# Patient Record
Sex: MALE | Race: WHITE | Employment: FULL TIME | URBAN - NONMETROPOLITAN AREA
[De-identification: names, ages, dates, MRNs, and addresses within clinical notes are randomized per-mention and may not be internally consistent; named-entity substitution may affect disease eponyms.]

---

## 2018-08-22 ENCOUNTER — HOSPITAL ENCOUNTER (EMERGENCY)
Age: 28
Discharge: HOME | End: 2018-08-22
Payer: COMMERCIAL

## 2018-08-22 VITALS
TEMPERATURE: 98.9 F | DIASTOLIC BLOOD PRESSURE: 72 MMHG | BODY MASS INDEX: 21.37 KG/M2 | HEART RATE: 82 BPM | OXYGEN SATURATION: 98 % | WEIGHT: 157.8 LBS | RESPIRATION RATE: 16 BRPM | SYSTOLIC BLOOD PRESSURE: 127 MMHG | HEIGHT: 72 IN

## 2018-08-22 DIAGNOSIS — R30.0 DYSURIA: ICD-10-CM

## 2018-08-22 DIAGNOSIS — Z20.2 EXPOSURE TO GONORRHEA: ICD-10-CM

## 2018-08-22 DIAGNOSIS — Z20.2 EXPOSURE TO CHLAMYDIA: Primary | ICD-10-CM

## 2018-08-22 DIAGNOSIS — Z11.3 SCREENING EXAMINATION FOR STD (SEXUALLY TRANSMITTED DISEASE): ICD-10-CM

## 2018-08-22 DIAGNOSIS — R36.9 DRAINAGE FROM PENIS: ICD-10-CM

## 2018-08-22 LAB
BILIRUBIN URINE: NEGATIVE
BLOOD, URINE: ABNORMAL
CHARACTER, URINE: CLEAR
CHLAMYDIA TRACHOMATIS BY RT-PCR: DETECTED
COLOR: ABNORMAL
CT/NG SOURCE: ABNORMAL
GLUCOSE, URINE: NEGATIVE MG/DL
KETONES, URINE: NEGATIVE
LEUKOCYTES, UA: ABNORMAL
NEISSERIA GONORRHOEAE BY RT-PCR: DETECTED
NITRATE, UA: NEGATIVE
PH UA: 6 (ref 5–9)
PROTEIN UA: NEGATIVE MG/DL
REFLEX TO URINE C & S: ABNORMAL
SPECIFIC GRAVITY UA: <= 1.005 (ref 1–1.03)
UROBILINOGEN, URINE: 0.2 EU/DL (ref 0–1)

## 2018-08-22 PROCEDURE — 99213 OFFICE O/P EST LOW 20 MIN: CPT | Performed by: NURSE PRACTITIONER

## 2018-08-22 PROCEDURE — 6360000002 HC RX W HCPCS: Performed by: NURSE PRACTITIONER

## 2018-08-22 PROCEDURE — 87591 N.GONORRHOEAE DNA AMP PROB: CPT

## 2018-08-22 PROCEDURE — 6370000000 HC RX 637 (ALT 250 FOR IP): Performed by: NURSE PRACTITIONER

## 2018-08-22 PROCEDURE — 2709999900 HC NON-CHARGEABLE SUPPLY

## 2018-08-22 PROCEDURE — 96372 THER/PROPH/DIAG INJ SC/IM: CPT

## 2018-08-22 PROCEDURE — 81003 URINALYSIS AUTO W/O SCOPE: CPT

## 2018-08-22 PROCEDURE — 2500000003 HC RX 250 WO HCPCS: Performed by: NURSE PRACTITIONER

## 2018-08-22 PROCEDURE — 87086 URINE CULTURE/COLONY COUNT: CPT

## 2018-08-22 PROCEDURE — 99213 OFFICE O/P EST LOW 20 MIN: CPT

## 2018-08-22 PROCEDURE — 87491 CHLMYD TRACH DNA AMP PROBE: CPT

## 2018-08-22 RX ORDER — METRONIDAZOLE 500 MG/1
2000 TABLET ORAL ONCE
Status: COMPLETED | OUTPATIENT
Start: 2018-08-22 | End: 2018-08-22

## 2018-08-22 RX ORDER — AZITHROMYCIN 250 MG/1
1000 TABLET, FILM COATED ORAL ONCE
Status: COMPLETED | OUTPATIENT
Start: 2018-08-22 | End: 2018-08-22

## 2018-08-22 RX ADMIN — LIDOCAINE HYDROCHLORIDE 250 MG: 10 INJECTION, SOLUTION EPIDURAL; INFILTRATION; INTRACAUDAL; PERINEURAL at 17:33

## 2018-08-22 RX ADMIN — AZITHROMYCIN 1000 MG: 250 TABLET, FILM COATED ORAL at 17:32

## 2018-08-22 RX ADMIN — METRONIDAZOLE 2000 MG: 500 TABLET, FILM COATED ORAL at 17:33

## 2018-08-22 ASSESSMENT — ENCOUNTER SYMPTOMS
SHORTNESS OF BREATH: 0
NAUSEA: 0
CHEST TIGHTNESS: 0
ABDOMINAL PAIN: 0
VOMITING: 0
DIARRHEA: 0

## 2018-08-24 LAB — URINE CULTURE REFLEX: NORMAL

## 2020-04-10 ENCOUNTER — HOSPITAL ENCOUNTER (EMERGENCY)
Age: 30
Discharge: HOME | End: 2020-04-10

## 2020-04-10 VITALS
BODY MASS INDEX: 21.7 KG/M2 | DIASTOLIC BLOOD PRESSURE: 81 MMHG | HEART RATE: 95 BPM | TEMPERATURE: 97.8 F | WEIGHT: 160 LBS | SYSTOLIC BLOOD PRESSURE: 116 MMHG | OXYGEN SATURATION: 98 % | RESPIRATION RATE: 16 BRPM

## 2020-04-10 LAB
AMPHETAMINE+METHAMPHETAMINE URINE SCREEN: NEGATIVE
BARBITURATE QUANTITATIVE URINE: NEGATIVE
BENZODIAZEPINE QUANTITATIVE URINE: NEGATIVE
CANNABINOID QUANTITATIVE URINE: NEGATIVE
COCAINE METABOLITE QUANTITATIVE URINE: NEGATIVE
OPIATES, URINE: NEGATIVE
OXYCODONE: NEGATIVE
PHENCYCLIDINE QUANTITATIVE URINE: NEGATIVE

## 2020-04-10 PROCEDURE — 80307 DRUG TEST PRSMV CHEM ANLYZR: CPT

## 2020-04-10 PROCEDURE — 6360000002 HC RX W HCPCS: Performed by: PHYSICIAN ASSISTANT

## 2020-04-10 PROCEDURE — 96372 THER/PROPH/DIAG INJ SC/IM: CPT

## 2020-04-10 PROCEDURE — 99284 EMERGENCY DEPT VISIT MOD MDM: CPT

## 2020-04-10 RX ORDER — SODIUM CHLORIDE 9 MG/ML
1000 INJECTION, SOLUTION INTRAVENOUS CONTINUOUS
Status: DISCONTINUED | OUTPATIENT
Start: 2020-04-10 | End: 2020-04-10

## 2020-04-10 RX ORDER — LORAZEPAM 2 MG/ML
1 INJECTION INTRAMUSCULAR ONCE
Status: COMPLETED | OUTPATIENT
Start: 2020-04-10 | End: 2020-04-10

## 2020-04-10 RX ORDER — LORAZEPAM 2 MG/ML
1 INJECTION INTRAMUSCULAR ONCE
Status: DISCONTINUED | OUTPATIENT
Start: 2020-04-10 | End: 2020-04-10

## 2020-04-10 RX ORDER — LORAZEPAM 1 MG/1
1 TABLET ORAL
Qty: 1 TABLET | Refills: 0 | Status: SHIPPED | OUTPATIENT
Start: 2020-04-10 | End: 2020-04-10

## 2020-04-10 RX ADMIN — LORAZEPAM 1 MG: 2 INJECTION INTRAMUSCULAR; INTRAVENOUS at 18:34

## 2020-04-10 ASSESSMENT — ENCOUNTER SYMPTOMS
SHORTNESS OF BREATH: 0
ABDOMINAL PAIN: 0
VOMITING: 0
NAUSEA: 0

## 2020-04-10 NOTE — ED NOTES
Pt reminded to  Stay on cart to relax- pt gets himself worked up while pacing the room.       Sander Talbert RN  04/10/20 [id number]

## 2020-04-10 NOTE — ED NOTES
Pt HR in 120s- went to  to check on pt and he was sitting on the side of the bed using the urinal. HR back down WNL once he was sitting down.  Provider updated     Particangella Acevedo RN  04/10/20 3489

## 2020-06-16 ENCOUNTER — NURSE ONLY (OUTPATIENT)
Dept: LAB | Age: 30
End: 2020-06-16

## 2020-06-16 LAB
ALBUMIN SERPL-MCNC: 5 G/DL (ref 3.5–5.1)
ALP BLD-CCNC: 70 U/L (ref 38–126)
ALT SERPL-CCNC: 16 U/L (ref 11–66)
ANION GAP SERPL CALCULATED.3IONS-SCNC: 10 MEQ/L (ref 8–16)
AST SERPL-CCNC: 16 U/L (ref 5–40)
BASOPHILS # BLD: 0.9 %
BASOPHILS ABSOLUTE: 0 THOU/MM3 (ref 0–0.1)
BILIRUB SERPL-MCNC: 0.4 MG/DL (ref 0.3–1.2)
BUN BLDV-MCNC: 9 MG/DL (ref 7–22)
CALCIUM SERPL-MCNC: 9.4 MG/DL (ref 8.5–10.5)
CHLORIDE BLD-SCNC: 103 MEQ/L (ref 98–111)
CHOLESTEROL, TOTAL: 156 MG/DL (ref 100–199)
CO2: 28 MEQ/L (ref 23–33)
CREAT SERPL-MCNC: 0.8 MG/DL (ref 0.4–1.2)
EOSINOPHIL # BLD: 4.1 %
EOSINOPHILS ABSOLUTE: 0.2 THOU/MM3 (ref 0–0.4)
ERYTHROCYTE [DISTWIDTH] IN BLOOD BY AUTOMATED COUNT: 12.4 % (ref 11.5–14.5)
ERYTHROCYTE [DISTWIDTH] IN BLOOD BY AUTOMATED COUNT: 44.5 FL (ref 35–45)
GFR SERPL CREATININE-BSD FRML MDRD: > 90 ML/MIN/1.73M2
GLUCOSE BLD-MCNC: 100 MG/DL (ref 70–108)
HCT VFR BLD CALC: 47.5 % (ref 42–52)
HDLC SERPL-MCNC: 59 MG/DL
HEMOGLOBIN: 15.4 GM/DL (ref 14–18)
IMMATURE GRANS (ABS): 0.01 THOU/MM3 (ref 0–0.07)
IMMATURE GRANULOCYTES: 0.2 %
LDL CHOLESTEROL CALCULATED: 75 MG/DL
LYMPHOCYTES # BLD: 42 %
LYMPHOCYTES ABSOLUTE: 2 THOU/MM3 (ref 1–4.8)
MCH RBC QN AUTO: 31.8 PG (ref 26–33)
MCHC RBC AUTO-ENTMCNC: 32.4 GM/DL (ref 32.2–35.5)
MCV RBC AUTO: 97.9 FL (ref 80–94)
MONOCYTES # BLD: 9.2 %
MONOCYTES ABSOLUTE: 0.4 THOU/MM3 (ref 0.4–1.3)
NUCLEATED RED BLOOD CELLS: 0 /100 WBC
PLATELET # BLD: 237 THOU/MM3 (ref 130–400)
PMV BLD AUTO: 12 FL (ref 9.4–12.4)
POTASSIUM SERPL-SCNC: 4.3 MEQ/L (ref 3.5–5.2)
RBC # BLD: 4.85 MILL/MM3 (ref 4.7–6.1)
SEG NEUTROPHILS: 43.6 %
SEGMENTED NEUTROPHILS ABSOLUTE COUNT: 2 THOU/MM3 (ref 1.8–7.7)
SODIUM BLD-SCNC: 141 MEQ/L (ref 135–145)
TOTAL PROTEIN: 8 G/DL (ref 6.1–8)
TRIGL SERPL-MCNC: 108 MG/DL (ref 0–199)
TSH SERPL DL<=0.05 MIU/L-ACNC: 3.12 UIU/ML (ref 0.4–4.2)
WBC # BLD: 4.7 THOU/MM3 (ref 4.8–10.8)

## 2020-07-13 ENCOUNTER — APPOINTMENT (OUTPATIENT)
Dept: CT IMAGING | Age: 30
End: 2020-07-13
Payer: COMMERCIAL

## 2020-07-13 ENCOUNTER — HOSPITAL ENCOUNTER (EMERGENCY)
Age: 30
Discharge: HOME | End: 2020-07-13
Attending: EMERGENCY MEDICINE
Payer: COMMERCIAL

## 2020-07-13 VITALS
OXYGEN SATURATION: 100 % | RESPIRATION RATE: 16 BRPM | HEIGHT: 72 IN | SYSTOLIC BLOOD PRESSURE: 116 MMHG | WEIGHT: 150 LBS | TEMPERATURE: 98.3 F | BODY MASS INDEX: 20.32 KG/M2 | DIASTOLIC BLOOD PRESSURE: 75 MMHG | HEART RATE: 84 BPM

## 2020-07-13 LAB
AMPHETAMINE+METHAMPHETAMINE URINE SCREEN: NEGATIVE
ANION GAP SERPL CALCULATED.3IONS-SCNC: 13 MEQ/L (ref 8–16)
BARBITURATE QUANTITATIVE URINE: NEGATIVE
BASOPHILS # BLD: 0.6 %
BASOPHILS ABSOLUTE: 0 THOU/MM3 (ref 0–0.1)
BENZODIAZEPINE QUANTITATIVE URINE: NEGATIVE
BILIRUBIN URINE: NEGATIVE
BLOOD, URINE: NEGATIVE
BUN BLDV-MCNC: 13 MG/DL (ref 7–22)
CALCIUM SERPL-MCNC: 9.8 MG/DL (ref 8.5–10.5)
CANNABINOID QUANTITATIVE URINE: NEGATIVE
CHARACTER, URINE: CLEAR
CHLORIDE BLD-SCNC: 102 MEQ/L (ref 98–111)
CO2: 26 MEQ/L (ref 23–33)
COCAINE METABOLITE QUANTITATIVE URINE: NEGATIVE
COLOR: YELLOW
CREAT SERPL-MCNC: 0.9 MG/DL (ref 0.4–1.2)
EKG ATRIAL RATE: 93 BPM
EKG P AXIS: 79 DEGREES
EKG P-R INTERVAL: 146 MS
EKG Q-T INTERVAL: 338 MS
EKG QRS DURATION: 92 MS
EKG QTC CALCULATION (BAZETT): 420 MS
EKG R AXIS: 83 DEGREES
EKG T AXIS: 73 DEGREES
EKG VENTRICULAR RATE: 93 BPM
EOSINOPHIL # BLD: 1 %
EOSINOPHILS ABSOLUTE: 0.1 THOU/MM3 (ref 0–0.4)
ERYTHROCYTE [DISTWIDTH] IN BLOOD BY AUTOMATED COUNT: 11.9 % (ref 11.5–14.5)
ERYTHROCYTE [DISTWIDTH] IN BLOOD BY AUTOMATED COUNT: 40.8 FL (ref 35–45)
GFR SERPL CREATININE-BSD FRML MDRD: > 90 ML/MIN/1.73M2
GLUCOSE BLD-MCNC: 105 MG/DL (ref 70–108)
GLUCOSE URINE: NEGATIVE MG/DL
HCT VFR BLD CALC: 44.6 % (ref 42–52)
HEMOGLOBIN: 15.2 GM/DL (ref 14–18)
IMMATURE GRANS (ABS): 0.02 THOU/MM3 (ref 0–0.07)
IMMATURE GRANULOCYTES: 0.2 %
KETONES, URINE: NEGATIVE
LEUKOCYTE ESTERASE, URINE: NEGATIVE
LYMPHOCYTES # BLD: 16.2 %
LYMPHOCYTES ABSOLUTE: 1.3 THOU/MM3 (ref 1–4.8)
MAGNESIUM: 2 MG/DL (ref 1.6–2.4)
MCH RBC QN AUTO: 32.4 PG (ref 26–33)
MCHC RBC AUTO-ENTMCNC: 34.1 GM/DL (ref 32.2–35.5)
MCV RBC AUTO: 95.1 FL (ref 80–94)
MONOCYTES # BLD: 7.4 %
MONOCYTES ABSOLUTE: 0.6 THOU/MM3 (ref 0.4–1.3)
NITRITE, URINE: NEGATIVE
NUCLEATED RED BLOOD CELLS: 0 /100 WBC
OPIATES, URINE: NEGATIVE
OSMOLALITY CALCULATION: 281.7 MOSMOL/KG (ref 275–300)
OXYCODONE: NEGATIVE
PH UA: 8.5 (ref 5–9)
PHENCYCLIDINE QUANTITATIVE URINE: NEGATIVE
PLATELET # BLD: 246 THOU/MM3 (ref 130–400)
PMV BLD AUTO: 10.2 FL (ref 9.4–12.4)
POTASSIUM SERPL-SCNC: 4.6 MEQ/L (ref 3.5–5.2)
PROTEIN UA: NEGATIVE
RBC # BLD: 4.69 MILL/MM3 (ref 4.7–6.1)
SEG NEUTROPHILS: 74.6 %
SEGMENTED NEUTROPHILS ABSOLUTE COUNT: 6 THOU/MM3 (ref 1.8–7.7)
SODIUM BLD-SCNC: 141 MEQ/L (ref 135–145)
SPECIFIC GRAVITY, URINE: 1.01 (ref 1–1.03)
T4 FREE: 1.18 NG/DL (ref 0.93–1.76)
TROPONIN T: < 0.01 NG/ML
TSH SERPL DL<=0.05 MIU/L-ACNC: 3.38 UIU/ML (ref 0.4–4.2)
UROBILINOGEN, URINE: 0.2 EU/DL (ref 0–1)
WBC # BLD: 8.1 THOU/MM3 (ref 4.8–10.8)

## 2020-07-13 PROCEDURE — 80307 DRUG TEST PRSMV CHEM ANLYZR: CPT

## 2020-07-13 PROCEDURE — 81003 URINALYSIS AUTO W/O SCOPE: CPT

## 2020-07-13 PROCEDURE — 84439 ASSAY OF FREE THYROXINE: CPT

## 2020-07-13 PROCEDURE — 80048 BASIC METABOLIC PNL TOTAL CA: CPT

## 2020-07-13 PROCEDURE — 99284 EMERGENCY DEPT VISIT MOD MDM: CPT

## 2020-07-13 PROCEDURE — 84443 ASSAY THYROID STIM HORMONE: CPT

## 2020-07-13 PROCEDURE — 93010 ELECTROCARDIOGRAM REPORT: CPT | Performed by: NUCLEAR MEDICINE

## 2020-07-13 PROCEDURE — 83735 ASSAY OF MAGNESIUM: CPT

## 2020-07-13 PROCEDURE — 84484 ASSAY OF TROPONIN QUANT: CPT

## 2020-07-13 PROCEDURE — 85025 COMPLETE CBC W/AUTO DIFF WBC: CPT

## 2020-07-13 PROCEDURE — 36415 COLL VENOUS BLD VENIPUNCTURE: CPT

## 2020-07-13 PROCEDURE — 70450 CT HEAD/BRAIN W/O DYE: CPT

## 2020-07-13 PROCEDURE — 93005 ELECTROCARDIOGRAM TRACING: CPT | Performed by: EMERGENCY MEDICINE

## 2020-07-13 ASSESSMENT — ENCOUNTER SYMPTOMS
NAUSEA: 0
CHEST TIGHTNESS: 0
DIARRHEA: 0
RHINORRHEA: 0
SORE THROAT: 0
SHORTNESS OF BREATH: 0
ABDOMINAL PAIN: 0
CONSTIPATION: 0
COLOR CHANGE: 0

## 2020-07-13 NOTE — ED PROVIDER NOTES
**This is a Medical/ PA/ APRN Student Note and is charted for educational purposes. The non-physician staff attested note is not to be used for billing purposes or to guide patient care. Please see the physician modifications/ attestation for treatment plan/suggestions. This note has been reviewed and feedback has been provided to the student. *                    [address]    Chief Complaint   Patient presents with    Palpitations    Shortness of Breath       Nurses Notes reviewed and I agree except as noted in the HPI. HPI    Jose David Hawley is a 34 y.o. male who presents for evaluation of tachycardia. He presents today following three 1-2 minute runs of palpitations while at work. Nothing has made his symptoms better or worse. In addition, he notes some numbness in his fingers, heat flashes, and diaphoresis. He does note that his mother has a history of pheochromocytoma. REVIEW OF SYSTEMS    Review of Systems   Constitutional: Negative for activity change, appetite change, chills and fever. HENT: Negative for rhinorrhea and sore throat. Respiratory: Negative for chest tightness and shortness of breath. Cardiovascular: Positive for palpitations. Negative for chest pain. Gastrointestinal: Negative for abdominal pain, constipation, diarrhea and nausea. Genitourinary: Negative for difficulty urinating, dysuria and enuresis. Skin: Negative for color change and rash. Neurological: Positive for numbness and headaches. Negative for dizziness. PAST MEDICAL HISTORY     has a past medical history of Anxiety, Depression, Irritable bowel syndrome, and Pneumothorax. SURGICAL HISTORY   has a past surgical history that includes Eye surgery; Appendectomy; eye surgery; and Thoracoscopy (08/03/2012).     CURRENT MEDICATIONS    Discharge Medication List as of 7/13/2020  7:45 PM      CONTINUE these medications which have NOT CHANGED    Details   fluticasone (FLONASE) 50 MCG/ACT nasal spray 1 spray by Nasal route daily, Disp-1 Bottle, R-0Print      ibuprofen (IBU) 600 MG tablet Take 1 tablet by mouth every 6 hours as needed for Pain., Disp-120 tablet, R-0             ALLERGIES    is allergic to morphine. FAMILY HISTORY    He indicated that his mother is alive. He indicated that his father is alive. He indicated that the status of his maternal grandfather is unknown. He indicated that the status of his paternal grandmother is unknown. He indicated that the status of his paternal grandfather is unknown. He indicated that the status of his maternal aunt is unknown.   family history includes Diabetes in his maternal aunt, maternal grandfather, mother, paternal grandfather, and paternal grandmother; High Blood Pressure in his mother; High Cholesterol in his father and mother; Other in his mother. Mother had a pheochromocytoma    SOCIAL HISTORY     reports that he has been smoking cigarettes. He has a 1.50 pack-year smoking history. He has never used smokeless tobacco. He reports current alcohol use of about 2.0 standard drinks of alcohol per week. He reports that he does not use drugs. PHYSICAL EXAM      INITIAL VITALS: /75   Pulse 84   Temp 98.3 °F (36.8 °C) (Oral)   Resp 16   Ht 6' (1.829 m)   Wt 150 lb (68 kg)   SpO2 100%   BMI 20.34 kg/m² Estimated body mass index is 20.34 kg/m² as calculated from the following:    Height as of this encounter: 6' (1.829 m). Weight as of this encounter: 150 lb (68 kg). Physical Exam  Constitutional:       Appearance: He is normal weight. HENT:      Head: Normocephalic and atraumatic. Nose: Nose normal.   Neck:      Musculoskeletal: Normal range of motion. Cardiovascular:      Rate and Rhythm: Normal rate and regular rhythm. Pulses: Normal pulses. Pulmonary:      Effort: Pulmonary effort is normal.      Breath sounds: No wheezing, rhonchi or rales. ANION GAP   GLOMERULAR FILTRATION RATE, ESTIMATED   OSMOLALITY     All other unresulted laboratory test above are normal:    Vitals:    Vitals:    07/13/20 1552 07/13/20 1725 07/13/20 1855 07/13/20 1924   BP: (!) 128/93 121/87 113/80 116/75   Pulse: 88 80 79 84   Resp: 18 11 16 16   Temp: 98.3 °F (36.8 °C)      TempSrc: Oral      SpO2: 100% 100% 99% 100%   Weight: 150 lb (68 kg)      Height: 6' (1.829 m)          EMERGENCY DEPARTMENT COURSE:    Medications - No data to display    The pt was seen and evaluated by me. Within the department, I observed the pt's vitalsigns to be within acceptable range. Laboratory and Radiological studies were performed, results were reviewed with the patient. Within the department, the pt was worked up but did not require any treatment. I observed the pt's condition to improved during the duration of their stay. I explained my proposed course of treatment to the pt, and they were amenable to my decision. They were discharged home, and they will return to the ED if their symptoms become more severein nature, or otherwise change. Controlled Substances Monitoring:        CRITICAL CARE:   None. CONSULTS:  None    PROCEDURES:  None. FINAL IMPRESSION       1. Palpitations          DISPOSITION/PLAN  PATIENT REFERRED TO:  DO Marta Ríos [address]  [place]    Schedule an appointment as soon as possible for a visit in 4 days      DISCHARGE MEDICATIONS:  Discharge Medication List as of 7/13/2020  7:45 PM            (Please note that portions of this note were completed with a voice recognition program and electronically transcribed. Efforts were [facility] edit the dictations but occasionally words are mis-transcribed . The transcription may contain errors not detected in proofreading.   This transcription was electronically signed.)     07/31/20 12:12 PM      No att. providers found      Emergency room physician       **This is a Medical/ PA/ APRN Student Note and is charted for educational purposes. The non-physician staff attested note is not to be used for billing purposes or to guide patient care. Please see the physician modifications/ attestation for treatment plan/suggestions. This note has been reviewed and feedback has been provided to the student.  Lacey Mendoza  07/31/20 5664

## 2020-07-13 NOTE — ED NOTES
Upon first contact with patient this RN [facility] [address] Pt resting in bed. No distress noted. Family at bedside. Will continue to monitor.       Sudhakar Holman RN  07/13/20 9830

## 2020-07-13 NOTE — ED NOTES
Bed: 012A  Expected date: 7/13/20  Expected time:   Means of arrival:   Comments:      Gloria Ramirez RN  07/13/20 5081

## 2020-07-13 NOTE — ED PROVIDER NOTES
Attending Physician Note:    Patient was seen by medical student Bea Castaneda and I co-managed the case    I have personally performed and participated in the history, exam and medical decision making and agree with all pertinent clinical information. I have also reviewed and agree with the past medical, family and social history unless otherwise noted. Patient presented to the emergency room due to palpitation 2 times while at work this afternoon. The patient stated he was working, working in [employer] and had palpitation for 2 minutes for 2 times and gotten better when the EMS was called in and brought the patient here. The patient admitted had the same problem in the past, he is being worked out for pheochromocytoma as the mother had pheochromocytoma. The patient admits that he had some lightheaded, dizziness with head movement with tingliness in the fingers at times. 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 Denies any slurring of speech denies any headache no blurred vision no weakness or numbness. Physical examination showed head normocephalic atraumatic oral mucosa is moist neck is supple lungs clear to auscultation, heart regular rate and rhythm, abdomen is flat soft depressible nontender no hepatosplenomegaly normoactive bowel sound, neurological examination is within normal limits. Evaluation: Agree above , seen the patient and discussed the diagnosis and treatment plans     Patient was sent home with a prescription for Holter monitor. Patient was taken off for 4 days from work    FINAL IMPRESSION       1.  Palpitations            DISPOSITION/PLAN  PATIENT REFERRED TO:  Chantale Queen DO  [address]    Schedule an appointment as soon as possible for a visit in 4 days      DISCHARGE MEDICATIONS:  New Prescriptions    No medications on file       (Please note that portions of this note were completed with a voice recognition program and electronically transcribed. Efforts were [facility] edit the dictations but occasionally words are mis-transcribed . The transcription may contain errors not detected in proofreading.   This transcription was electronically signed.)      Ubaldo Hernandez MD      Emergency room physician       Ubaldo Hernandez MD  07/13/20 [address], [place]  07/16/20 8017

## 2020-07-13 NOTE — LETTER
[facility] [address] [address] EMERGENCY DEPT  [address] [place] [id number]  Phone: [phone]               July 13, 2020    Patient: Elizabeth Bell   YOB: 1990   Date of Visit: 7/13/2020       To Whom It May Concern:    Kellie Proctor was seen and treated in our emergency department on 7/13/2020. He may return to work on 7/17/20.       Sincerely,       Moon Ellison MD         Signature:__________________________________

## 2020-07-13 NOTE — ED NOTES
Patient transported to Radiology department via [employer] tech in stable condition.        Dixie Champagne RN  07/13/20 6298

## 2020-07-13 NOTE — ED NOTES
EKG/ holter monitor called for monitor. Will call back with update.       Sudhakar Holman RN  07/13/20 0061

## 2020-07-13 NOTE — ED TRIAGE NOTES
Pt presents to ER via squad with shortness of breath and heart palpitations that started about 45 minutes ago. Pt states he is feeling better at this time, HR currently 88. Squad reports initial HR to be 125. EKG completed. Denies pain.

## 2020-07-14 ENCOUNTER — HOSPITAL ENCOUNTER (OUTPATIENT)
Dept: NON INVASIVE DIAGNOSTICS | Age: 30
Discharge: HOME | End: 2020-07-14
Payer: COMMERCIAL

## 2020-07-14 ENCOUNTER — CARE COORDINATION (OUTPATIENT)
Dept: CARE COORDINATION | Age: 30
End: 2020-07-14

## 2020-07-14 PROCEDURE — 93226 XTRNL ECG REC<48 HR SCAN A/R: CPT

## 2020-07-14 PROCEDURE — 93225 XTRNL ECG REC<48 HRS REC: CPT

## 2020-07-15 ENCOUNTER — CARE COORDINATION (OUTPATIENT)
Dept: CARE COORDINATION | Age: 30
End: 2020-07-15

## 2020-07-15 NOTE — CARE COORDINATION
Attempted to reach [facility] today for ED f/u [facility] outreach. No answer. Message left to return call.

## 2020-07-15 NOTE — CARE COORDINATION
Patient contacted regarding recent discharge and COVID-19 risk. Discussed COVID-19 related testing which was not done at this time. Test results were not done. Patient informed of results, if available? n/a     Care Transition Nurse/ Ambulatory Care Manager contacted the patient by telephone to perform post discharge assessment. Verified name and  with patient as identifiers. Patient has following risk factors of: no known risk factors. CTN/ACM reviewed discharge instructions, medical action plan and red flags related to discharge diagnosis. Reviewed and educated them on any new and changed medications related to discharge diagnosis. Advised obtaining a 90-day supply of all daily and as-needed medications. Education provided regarding infection prevention, and signs and symptoms of COVID-19 and when to seek medical attention with patient who verbalized understanding. Discussed exposure protocols and quarantine from [address] you at higher risk for severe illness  and given an opportunity for questions and concerns. The patient agrees to contact the COVID-19 hotline [phone] or PCP office for questions related to their healthcare. CTN/ACM provided contact information for future reference. From CDC: Are you at higher risk for severe illness?  Wash your hands often.  Avoid close contact (6 feet, which is about two arm lengths) with people who are sick.  Put distance between yourself and other people if COVID-19 is spreading in your community.  Clean and disinfect frequently touched surfaces.  Avoid all cruise travel and non-essential air travel.  Call your healthcare professional if you have concerns about COVID-19 and your underlying condition or if you are sick. For more information on steps you can take to protect yourself, see CDC's How to [address] [place] [address] for follow-up call in 7-14 days based on severity of symptoms and risk factors. Spoke with pt.   Pt currently has holter monitor in place. Reports having 1 episode since d/c. Has f/u arranged with his PCP in upcoming wk. Declines Loop. Reviewed ACP.

## 2020-07-21 LAB
ACQUISITION DURATION: NORMAL S
AVERAGE HEART RATE: 70 BPM
HOOKUP DATE: NORMAL
HOOKUP TIME: NORMAL
MAX HEART RATE TIME/DATE: NORMAL
MAX HEART RATE: 163 BPM
MIN HEART RATE TIME/DATE: NORMAL
MIN HEART RATE: 42 BPM
NUMBER OF QRS COMPLEXES: NORMAL
NUMBER OF SUPRAVENTRICULAR BEATS IN RUNS: 0
NUMBER OF SUPRAVENTRICULAR COUPLETS: 0
NUMBER OF SUPRAVENTRICULAR ECTOPICS: 2
NUMBER OF SUPRAVENTRICULAR ISOLATED BEATS: 2
NUMBER OF SUPRAVENTRICULAR RUNS: 0
NUMBER OF VENTRICULAR BEATS IN RUNS: 0
NUMBER OF VENTRICULAR BIGEMINAL CYCLES: 0
NUMBER OF VENTRICULAR COUPLETS: 1
NUMBER OF VENTRICULAR ECTOPICS: 3
NUMBER OF VENTRICULAR ISOLATED BEATS: 1
NUMBER OF VENTRICULAR RUNS: 0

## 2020-07-29 ENCOUNTER — CARE COORDINATION (OUTPATIENT)
Dept: CARE COORDINATION | Age: 30
End: 2020-07-29

## 2020-07-29 NOTE — CARE COORDINATION
Attempted to reach [facility] today for ED f/u covid resolve. No answer. Message left to return call.

## 2021-09-29 ENCOUNTER — TELEPHONE (OUTPATIENT)
Dept: CARDIOLOGY CLINIC | Age: 31
End: 2021-09-29

## 2021-10-05 NOTE — TELEPHONE ENCOUNTER
LM for pt to return call. Closing encounter. Will send encounter to Dr Sravanthi Stevens office to let them know pt has not responded.

## 2021-10-26 ENCOUNTER — HOSPITAL ENCOUNTER (EMERGENCY)
Age: 31
Discharge: HOME | End: 2021-10-26
Payer: COMMERCIAL

## 2021-10-26 ENCOUNTER — APPOINTMENT (OUTPATIENT)
Dept: GENERAL RADIOLOGY | Age: 31
End: 2021-10-26
Payer: COMMERCIAL

## 2021-10-26 VITALS
HEIGHT: 70 IN | DIASTOLIC BLOOD PRESSURE: 84 MMHG | TEMPERATURE: 98.7 F | SYSTOLIC BLOOD PRESSURE: 120 MMHG | RESPIRATION RATE: 11 BRPM | WEIGHT: 150 LBS | BODY MASS INDEX: 21.47 KG/M2 | OXYGEN SATURATION: 100 % | HEART RATE: 70 BPM

## 2021-10-26 DIAGNOSIS — F41.9 ANXIETY: ICD-10-CM

## 2021-10-26 DIAGNOSIS — R00.2 PALPITATIONS: Primary | ICD-10-CM

## 2021-10-26 LAB
ALBUMIN SERPL-MCNC: 4.9 G/DL (ref 3.5–5.1)
ALP BLD-CCNC: 78 U/L (ref 38–126)
ALT SERPL-CCNC: 20 U/L (ref 11–66)
AMPHETAMINE+METHAMPHETAMINE URINE SCREEN: NEGATIVE
ANION GAP SERPL CALCULATED.3IONS-SCNC: 10 MEQ/L (ref 8–16)
AST SERPL-CCNC: 20 U/L (ref 5–40)
BARBITURATE QUANTITATIVE URINE: NEGATIVE
BASOPHILS # BLD: 0.9 %
BASOPHILS ABSOLUTE: 0 THOU/MM3 (ref 0–0.1)
BENZODIAZEPINE QUANTITATIVE URINE: NEGATIVE
BILIRUB SERPL-MCNC: 0.3 MG/DL (ref 0.3–1.2)
BILIRUBIN DIRECT: < 0.2 MG/DL (ref 0–0.3)
BILIRUBIN URINE: NEGATIVE
BLOOD, URINE: NEGATIVE
BUN BLDV-MCNC: 16 MG/DL (ref 7–22)
CALCIUM SERPL-MCNC: 9.9 MG/DL (ref 8.5–10.5)
CANNABINOID QUANTITATIVE URINE: NEGATIVE
CHARACTER, URINE: CLEAR
CHLORIDE BLD-SCNC: 98 MEQ/L (ref 98–111)
CO2: 28 MEQ/L (ref 23–33)
COCAINE METABOLITE QUANTITATIVE URINE: NEGATIVE
COLOR: YELLOW
CREAT SERPL-MCNC: 1 MG/DL (ref 0.4–1.2)
D-DIMER QUANTITATIVE: 293 NG/ML FEU (ref 0–500)
EKG ATRIAL RATE: 69 BPM
EKG ATRIAL RATE: 84 BPM
EKG P AXIS: 40 DEGREES
EKG P AXIS: 73 DEGREES
EKG P-R INTERVAL: 116 MS
EKG P-R INTERVAL: 122 MS
EKG Q-T INTERVAL: 348 MS
EKG Q-T INTERVAL: 378 MS
EKG QRS DURATION: 86 MS
EKG QRS DURATION: 90 MS
EKG QTC CALCULATION (BAZETT): 405 MS
EKG QTC CALCULATION (BAZETT): 411 MS
EKG R AXIS: 61 DEGREES
EKG R AXIS: 85 DEGREES
EKG T AXIS: -4 DEGREES
EKG T AXIS: 4 DEGREES
EKG VENTRICULAR RATE: 69 BPM
EKG VENTRICULAR RATE: 84 BPM
EOSINOPHIL # BLD: 3 %
EOSINOPHILS ABSOLUTE: 0.1 THOU/MM3 (ref 0–0.4)
ERYTHROCYTE [DISTWIDTH] IN BLOOD BY AUTOMATED COUNT: 12.3 % (ref 11.5–14.5)
ERYTHROCYTE [DISTWIDTH] IN BLOOD BY AUTOMATED COUNT: 41.9 FL (ref 35–45)
GFR SERPL CREATININE-BSD FRML MDRD: 87 ML/MIN/1.73M2
GLUCOSE BLD-MCNC: 83 MG/DL (ref 70–108)
GLUCOSE URINE: NEGATIVE MG/DL
HCT VFR BLD CALC: 44.7 % (ref 42–52)
HEMOGLOBIN: 15 GM/DL (ref 14–18)
IMMATURE GRANS (ABS): 0.01 THOU/MM3 (ref 0–0.07)
IMMATURE GRANULOCYTES: 0.2 %
KETONES, URINE: NEGATIVE
LEUKOCYTE ESTERASE, URINE: NEGATIVE
LIPASE: 25.6 U/L (ref 5.6–51.3)
LYMPHOCYTES # BLD: 26.7 %
LYMPHOCYTES ABSOLUTE: 1.3 THOU/MM3 (ref 1–4.8)
MAGNESIUM: 2 MG/DL (ref 1.6–2.4)
MCH RBC QN AUTO: 31.1 PG (ref 26–33)
MCHC RBC AUTO-ENTMCNC: 33.6 GM/DL (ref 32.2–35.5)
MCV RBC AUTO: 92.7 FL (ref 80–94)
MONOCYTES # BLD: 9.9 %
MONOCYTES ABSOLUTE: 0.5 THOU/MM3 (ref 0.4–1.3)
NITRITE, URINE: NEGATIVE
NUCLEATED RED BLOOD CELLS: 0 /100 WBC
OPIATES, URINE: NEGATIVE
OSMOLALITY CALCULATION: 272.3 MOSMOL/KG (ref 275–300)
OXYCODONE: NEGATIVE
PH UA: 8 (ref 5–9)
PHENCYCLIDINE QUANTITATIVE URINE: NEGATIVE
PLATELET # BLD: 244 THOU/MM3 (ref 130–400)
PMV BLD AUTO: 10.3 FL (ref 9.4–12.4)
POTASSIUM SERPL-SCNC: 4.1 MEQ/L (ref 3.5–5.2)
PROTEIN UA: NEGATIVE
RBC # BLD: 4.82 MILL/MM3 (ref 4.7–6.1)
SEG NEUTROPHILS: 59.3 %
SEGMENTED NEUTROPHILS ABSOLUTE COUNT: 2.8 THOU/MM3 (ref 1.8–7.7)
SODIUM BLD-SCNC: 136 MEQ/L (ref 135–145)
SPECIFIC GRAVITY, URINE: 1.01 (ref 1–1.03)
T4 FREE: 1.3 NG/DL (ref 0.93–1.76)
TOTAL PROTEIN: 7.9 G/DL (ref 6.1–8)
TROPONIN T: < 0.01 NG/ML
TROPONIN T: < 0.01 NG/ML
TSH SERPL DL<=0.05 MIU/L-ACNC: 5.68 UIU/ML (ref 0.4–4.2)
UROBILINOGEN, URINE: 0.2 EU/DL (ref 0–1)
WBC # BLD: 4.7 THOU/MM3 (ref 4.8–10.8)

## 2021-10-26 PROCEDURE — 36415 COLL VENOUS BLD VENIPUNCTURE: CPT

## 2021-10-26 PROCEDURE — 80307 DRUG TEST PRSMV CHEM ANLYZR: CPT

## 2021-10-26 PROCEDURE — 84443 ASSAY THYROID STIM HORMONE: CPT

## 2021-10-26 PROCEDURE — 83690 ASSAY OF LIPASE: CPT

## 2021-10-26 PROCEDURE — 84439 ASSAY OF FREE THYROXINE: CPT

## 2021-10-26 PROCEDURE — 81003 URINALYSIS AUTO W/O SCOPE: CPT

## 2021-10-26 PROCEDURE — 85025 COMPLETE CBC W/AUTO DIFF WBC: CPT

## 2021-10-26 PROCEDURE — 93005 ELECTROCARDIOGRAM TRACING: CPT | Performed by: NURSE PRACTITIONER

## 2021-10-26 PROCEDURE — 71045 X-RAY EXAM CHEST 1 VIEW: CPT

## 2021-10-26 PROCEDURE — 83735 ASSAY OF MAGNESIUM: CPT

## 2021-10-26 PROCEDURE — 85379 FIBRIN DEGRADATION QUANT: CPT

## 2021-10-26 PROCEDURE — 82248 BILIRUBIN DIRECT: CPT

## 2021-10-26 PROCEDURE — 99284 EMERGENCY DEPT VISIT MOD MDM: CPT

## 2021-10-26 PROCEDURE — 80053 COMPREHEN METABOLIC PANEL: CPT

## 2021-10-26 PROCEDURE — 84484 ASSAY OF TROPONIN QUANT: CPT

## 2021-10-26 NOTE — ED NOTES
Patient resting in bed with no distress noted. Call light within reach. Respirations unlabored and easy.       Haroldo Rutledge RN  10/26/21 1924

## 2021-10-26 NOTE — ED NOTES
Patient resting in bed on phone. Call light within reach. Respirations unlabored and easy. No distress noted at this time.       Trinidad Trivedi RN  10/26/21 8449

## 2021-10-26 NOTE — ED NOTES
Pt resting in bed on phone. Patient denies any needs at this time. Call light within reach. Door closed and curtain closed for comfort.       Claire Galvin RN  10/26/21 9221

## 2021-10-26 NOTE — ED NOTES
Pt resting in bed at this time. Door closed for comfort. Call light within reach.  ELMER Ventura RN  10/26/21 6376

## 2021-11-02 ENCOUNTER — HOSPITAL ENCOUNTER (EMERGENCY)
Age: 31
Discharge: HOME | End: 2021-11-02
Attending: EMERGENCY MEDICINE
Payer: COMMERCIAL

## 2021-11-02 VITALS
WEIGHT: 160 LBS | DIASTOLIC BLOOD PRESSURE: 88 MMHG | SYSTOLIC BLOOD PRESSURE: 120 MMHG | HEIGHT: 72 IN | TEMPERATURE: 97.8 F | BODY MASS INDEX: 21.67 KG/M2 | RESPIRATION RATE: 14 BRPM | HEART RATE: 71 BPM | OXYGEN SATURATION: 100 %

## 2021-11-02 DIAGNOSIS — R00.2 PALPITATIONS: Primary | ICD-10-CM

## 2021-11-02 LAB
ANION GAP: 10 MEQ/L (ref 8–16)
BASOPHILS # BLD: 0.6 % (ref 0–3)
BUN BLDV-MCNC: 13 MG/DL (ref 7–18)
CHLORIDE BLD-SCNC: 102 MEQ/L (ref 98–107)
CO2: 28 MEQ/L (ref 21–32)
CREAT SERPL-MCNC: 1.1 MG/DL (ref 0.6–1.3)
EOSINOPHILS RELATIVE PERCENT: 2.6 % (ref 0–4)
GFR, ESTIMATED: 83 ML/MIN/1.73M2
GLUCOSE BLD-MCNC: 96 MG/DL (ref 74–106)
HCT VFR BLD CALC: 45 % (ref 42–52)
HEMOGLOBIN: 14.7 GM/DL (ref 14–18)
LYMPHOCYTES # BLD: 31.5 % (ref 15–47)
MAGNESIUM: 2 MG/DL (ref 1.8–2.4)
MCH RBC QN AUTO: 30.1 PG (ref 27–31)
MCHC RBC AUTO-ENTMCNC: 32.8 GM/DL (ref 33–37)
MCV RBC AUTO: 92 FL (ref 80–94)
MONOCYTES: 8.8 % (ref 0–12)
PDW BLD-RTO: 12.8 % (ref 11.5–14.5)
PLATELET # BLD: 240 THOU/MM3 (ref 130–400)
PMV BLD AUTO: 9 FL (ref 7.4–10.4)
POC CALCIUM: 9.5 MG/DL (ref 8.5–10.1)
POTASSIUM SERPL-SCNC: 4 MEQ/L (ref 3.5–5.1)
RBC # BLD: 4.89 MILL/MM3 (ref 4.7–6.1)
SEGS: 56.5 % (ref 43–75)
SODIUM BLD-SCNC: 140 MEQ/L (ref 136–145)
TROPONIN I: < 0.017 NG/ML (ref 0.02–0.06)
WBC # BLD: 4.7 THOU/MM3 (ref 4.8–10.8)

## 2021-11-02 PROCEDURE — 84484 ASSAY OF TROPONIN QUANT: CPT

## 2021-11-02 PROCEDURE — 93005 ELECTROCARDIOGRAM TRACING: CPT | Performed by: EMERGENCY MEDICINE

## 2021-11-02 PROCEDURE — 80048 BASIC METABOLIC PNL TOTAL CA: CPT

## 2021-11-02 PROCEDURE — 99283 EMERGENCY DEPT VISIT LOW MDM: CPT

## 2021-11-02 PROCEDURE — 85025 COMPLETE CBC W/AUTO DIFF WBC: CPT

## 2021-11-02 PROCEDURE — 83735 ASSAY OF MAGNESIUM: CPT

## 2021-11-02 PROCEDURE — 36415 COLL VENOUS BLD VENIPUNCTURE: CPT

## 2021-11-02 RX ORDER — FAMOTIDINE 20 MG/1
20 TABLET, FILM COATED ORAL 2 TIMES DAILY
COMMUNITY
Start: 2021-10-21 | End: 2022-05-03

## 2021-11-02 RX ORDER — BUPROPION HYDROCHLORIDE 150 MG/1
150 TABLET ORAL DAILY
COMMUNITY
Start: 2021-10-29

## 2021-11-02 ASSESSMENT — ENCOUNTER SYMPTOMS
ABDOMINAL PAIN: 0
COUGH: 0
CHEST TIGHTNESS: 0
SORE THROAT: 0
BACK PAIN: 0
EYE REDNESS: 0
RHINORRHEA: 0
SHORTNESS OF BREATH: 0
DIARRHEA: 0
VOMITING: 0
ABDOMINAL PAIN: 0
NAUSEA: 0
WHEEZING: 0
BLOOD IN STOOL: 0
SHORTNESS OF BREATH: 0

## 2021-11-02 NOTE — ED PROVIDER NOTES
[facility] Emergency Department    CHIEF COMPLAINT       Chief Complaint   Patient presents with    Palpitations       Nurses Notes reviewed and I agree except as noted in the HPI. HISTORY OF PRESENT ILLNESS    Carlos Stacy cinda 27 y.o. male who presents to the ED for evaluation of palpitations and chest discomfort. The patient is a very anxious person. He has been having palpitations for the last several months. Tonight they came and wouldn't go away. He appears anxious and restless. He states that the palpitations give him a little chest pain. HPI was provided by the patient    REVIEW OF SYSTEMS     Review of Systems   Constitutional: Negative for chills, fatigue and fever. HENT: Negative for congestion, ear discharge, ear pain, postnasal drip and rhinorrhea. Eyes: Negative for redness. Respiratory: Negative for cough, chest tightness and shortness of breath. Cardiovascular: Positive for chest pain and palpitations. Negative for leg swelling. Gastrointestinal: Negative for abdominal pain, nausea and vomiting. Genitourinary: Negative for difficulty urinating, dysuria, enuresis, flank pain and hematuria. Musculoskeletal: Negative for back pain and joint swelling. Skin: Negative for rash. Neurological: Negative for dizziness, light-headedness, numbness and headaches. Psychiatric/Behavioral: Positive for agitation, decreased concentration and dysphoric mood. Negative for behavioral problems, confusion and hallucinations. The patient is nervous/anxious. The patient is not hyperactive. All other systems negative except as noted. PAST MEDICAL HISTORY     Past Medical History:   Diagnosis Date    Anxiety     Depression     Irritable bowel syndrome     Says his last episode was in 2011    Pneumothorax        SURGICALHISTORY      has a past surgical history that includes Eye surgery; Appendectomy; eye surgery; and Thoracoscopy (08/03/2012).     CURRENT MEDICATIONS Discharge Medication List as of 10/26/2021  7:42 PM      CONTINUE these medications which have NOT CHANGED    Details   fluticasone (FLONASE) 50 MCG/ACT nasal spray 1 spray by Nasal route daily, Disp-1 Bottle, R-0Print      ibuprofen (IBU) 600 MG tablet Take 1 tablet by mouth every 6 hours as needed for Pain., Disp-120 tablet, R-0             ALLERGIES     is allergic to morphine. FAMILY HISTORY     He indicated that his mother is alive. He indicated that his father is alive. He indicated that the status of his maternal grandfather is unknown. He indicated that the status of his paternal grandmother is unknown. He indicated that the status of his paternal grandfather is unknown. He indicated that the status of his maternal aunt is unknown.   family history includes Diabetes in his maternal aunt, maternal grandfather, mother, paternal grandfather, and paternal grandmother; High Blood Pressure in his mother; High Cholesterol in his father and mother; Other in his mother. SOCIAL HISTORY       Social History     Socioeconomic History    Marital status: Single     Spouse name: Not on file    Number of children: Not on file    Years of education: Not on file    Highest education level: Not on file   Occupational History    Not on file   Tobacco Use    Smoking status: Current Every Day Smoker     Packs/day: 0.50     Years: 3.00     Pack years: 1.50     Types: Cigarettes     Last attempt to quit: 2012     Years since quittin.8    Smokeless tobacco: Never Used   Vaping Use    Vaping Use: Every day   Substance and Sexual Activity    Alcohol use:  Yes     Alcohol/week: 2.0 standard drinks     Types: 2 Cans of beer per week     Comment: every weekend    Drug use: No    Sexual activity: Not on file   Other Topics Concern    Not on file   Social History Narrative    Not on file     Social Determinants of Health     Financial Resource Strain:     Difficulty of Paying Living Expenses:    Food Insecurity:     Worried About Running Out of Food in the Last Year:     [address] in the Last Year:    Transportation Needs:     Lack of Transportation (Medical):  Lack of Transportation (Non-Medical):    Physical Activity:     Days of Exercise per Week:     Minutes of Exercise per Session:    Stress:     Feeling of Stress :    Social Connections:     Frequency of Communication with Friends and Family:     Frequency of Social Gatherings with Friends and Family:     Attends [employer] Services:     Active Member of Clubs or Organizations:     Attends Club or Organization Meetings:     Marital Status:    Intimate Partner Violence:     Fear of Current or Ex-Partner:     Emotionally Abused:     Physically Abused:     Sexually Abused:        PHYSICAL EXAM     INITIAL VITALS:  height is 5' 10\" (1.778 m) and weight is 150 lb (68 kg). His oral temperature is 98.7 °F (37.1 °C). His blood pressure is 120/84 and his pulse is 70. His respiration is 11 and oxygen saturation is 100%. Physical Exam  Vitals and nursing note reviewed. Constitutional:       General: He is not in acute distress. Appearance: Normal appearance. He is well-developed. He is not ill-appearing or diaphoretic. HENT:      Head: Normocephalic and atraumatic. Nose: Nose normal.      Mouth/Throat:      Mouth: Mucous membranes are moist.      Pharynx: Oropharynx is clear. Eyes:      General:         Right eye: No discharge. Left eye: No discharge. Conjunctiva/sclera: Conjunctivae normal.   Neck:      Trachea: No tracheal deviation. Cardiovascular:      Rate and Rhythm: Normal rate and regular rhythm. Pulses: Normal pulses. Heart sounds: Normal heart sounds. No murmur heard. No gallop. Comments: Normal capillary refill  Pulmonary:      Effort: Pulmonary effort is normal. No respiratory distress. Breath sounds: Normal breath sounds. No stridor.    Abdominal:      General: Bowel sounds are normal.      Palpations: Abdomen is soft. Musculoskeletal:         General: No tenderness or deformity. Normal range of motion. Cervical back: Normal range of motion. Skin:     General: Skin is warm and dry. Capillary Refill: Capillary refill takes less than 2 seconds. Coloration: Skin is not pale. Findings: No erythema or rash. Neurological:      General: No focal deficit present. Mental Status: He is alert and oriented to person, place, and time. Cranial Nerves: No cranial nerve deficit. Psychiatric:         Mood and Affect: Mood is anxious.          Behavior: Behavior normal.         DIFFERENTIAL DIAGNOSIS:   Palpitations, anxiety    DIAGNOSTIC RESULTS     EKG: All EKG's are interpreted by the Emergency Department Physician who eithersigns or Co-signs this chart in the absence of a cardiologist.      EKG 12 Lead (Final result)   Component (Lab Inquiry)  Collection Time Result Time Ventricular Rate Atrial Rate P-R Interval QRS Duration Q-T Interval QTc Calculation (Bazett) P Axis R Axis T Axis   10/26/21 15:49:25 10/26/21 17:33:02 69 69 116 90 378 405 40 61 4   Previous Results   10/26/21 13:34:34 10/26/21 17:32:32 84 84 122 86 348 411 73 85 -4   07/13/20 15:52:43 07/13/20 15:52:43 93 93 146 92 338 420 79 83 73   05/11/17 12:09:00 05/11/17 23:52:01 64 64 126 98 370 381 71 79 65   04/16/16 00:00:05 04/16/16 21:28:29 80 80 134 92 370 426 75 82 63   04/16/16 00:00:05 04/16/16 00:30:19 80 80 134 92 370 426 75 82 63         Final result                Narrative:    Normal sinus rhythm   Possible Lateral infarct (cited on or before 26-OCT-2021)   Possible Inferior infarct (cited on or before 26-OCT-2021)   Abnormal ECG   When compared with ECG of 26-OCT-2021 13:34,   Non-specific change in ST segment in Anterior leads   Confirmed by CHUCK HANSEN ([id number]) on 10/26/2021 5:32:56 PM                      EKG 12 Lead (Final result)   Component (Lab Inquiry)  Collection Time Result Time Ventricular Rate Atrial Rate P-R Interval QRS Duration Q-T Interval QTc Calculation (Bazett) P Axis R Axis T Axis   10/26/21 13:34:34 10/26/21 17:32:32 84 84 122 86 348 411 73 85 -4   Previous Results   07/13/20 15:52:43 07/13/20 15:52:43 93 93 146 92 338 420 79 83 73   05/11/17 12:09:00 05/11/17 23:52:01 64 64 126 98 370 381 71 79 65   04/16/16 00:00:05 04/16/16 21:28:29 80 80 134 92 370 426 75 82 63   04/16/16 00:00:05 04/16/16 00:30:19 80 80 134 92 370 426 75 82 63   04/15/16 23:59:00 04/16/16 00:29:57 72 72 132 90 368 402 73 81 62         Final result                Narrative:    Normal sinus rhythm with sinus arrhythmia   Possible Inferior infarct , age undetermined   Possible Anterior infarct , age undetermined   Abnormal ECG   When compared with ECG of 13-JUL-2020 15:52,   Significant changes have occurred   Confirmed by Farshad Muñiz ([id number]) on 10/26/2021 5:32:26 PM                        RADIOLOGY: non-plainfilm images(s) such as CT, Ultrasound and MRI are read by the radiologist.  Plain radiographic images are visualized and preliminarily interpreted by the emergency physician unless otherwise stated below. XR CHEST PORTABLE   Final Result      No acute intrathoracic process. **This report has been created using voice recognition software. It may contain minor errors which are inherent in voice recognition technology. **      Final report electronically signed by Dr Willem Sarmiento on 10/26/2021 2:43 PM            LABS:   Labs Reviewed   CBC WITH AUTO DIFFERENTIAL - Abnormal; Notable for the following components:       Result Value    WBC 4.7 (*)     All other components within normal limits   TSH WITH REFLEX - Abnormal; Notable for the following components:    TSH 5.680 (*)     All other components within normal limits   GLOMERULAR FILTRATION RATE, ESTIMATED - Abnormal; Notable for the following components:    Est, Glom Filt Rate 87 (*)     All other components within normal limits   OSMOLALITY - Abnormal; Notable for the following components:    Osmolality Calc 272.3 (*)     All other components within normal limits   BASIC METABOLIC PANEL   HEPATIC FUNCTION PANEL   LIPASE   MAGNESIUM   TROPONIN   URINE DRUG SCREEN   URINE RT REFLEX TO CULTURE   ANION GAP   T4, FREE   D-DIMER, QUANTITATIVE   TROPONIN       EMERGENCY DEPARTMENT COURSE:   Vitals:    Vitals:    10/26/21 1811 10/26/21 1812 10/26/21 1911 10/26/21 1912   BP: 125/77  120/84    Pulse: 67 70 69 70   Resp: 10 14 11 11   Temp:       TempSrc:       SpO2:  99%  100%   Weight:       Height:                MDM    Seen evaluate in the emergency room for palpitations. Appropriate labs and imaging are ordered and reviewed. Per chart review this is not the first time this is happened. Patient is worn a Holter monitor in the past.  Labs and imaging are reassuring. I did discuss these my attending Dr. Tash Rhodes. He advised that she do a D-dimer and repeat the troponin. I did this in the patient status stayed the same. Discussed the case with patient and his significant other. They are comfortable with discharge home with follow-up. Medications - No data to display      Patient was seen independently by myself. The patient's final impression and disposition and plan was determined by myself. Strict return precautions and follow up instructions were discussed with the patient prior to discharge, with which the patient agrees. Physical assessment findings, diagnostic testing(s) if applicable, and vital signs reviewed with patient/patient representative. Questions answered. Medications asdirected, including OTC medications for supportive care. Education provided on medications. Differential diagnosis(s) discussed with patient/patient representative. Home care/self care instructions reviewed withpatient/patient representative. Patient is to follow-up with family care provider in 2-3 days if no improvement.   Patient is to go to the emergency department if symptoms worsen. Patient/patient representative isaware of care plan, questions answered, verbalizes understanding and is in agreement. CRITICAL CARE:   None    CONSULTS:  None    PROCEDURES:  None    FINAL IMPRESSION     1. Palpitations    2.  Anxiety          DISPOSITION/PLAN   DISPOSITION Decision To Discharge 10/26/2021 07:42:44 PM      PATIENT REFERREDTO:  Jerome AyalaDO [address]  [address] [facility]  [phone]    Schedule an appointment as soon as possible for a visit in 2 days  For follow up    [address]  Call in 1 day  to schedule    [facility] [address]  [facility] [address]  Schedule an appointment as soon as possible for a visit in 2 days  For follow up      DISCHARGE MEDICATIONS:  Discharge Medication List as of 10/26/2021  7:42 PM          (Please note that portions of this note were completed with a voice recognition program.  Efforts were made to edit the dictations but occasionally words are mis-transcribed.)         REFUGIO Banks CNP, APRN - CNP  11/02/21 [id number] [facility], APRN - CNP  12/30/21 [id number]

## 2021-11-03 LAB
EKG ATRIAL RATE: 84 BPM
EKG P AXIS: 71 DEGREES
EKG P-R INTERVAL: 148 MS
EKG Q-T INTERVAL: 358 MS
EKG QRS DURATION: 90 MS
EKG QTC CALCULATION (BAZETT): 423 MS
EKG R AXIS: 70 DEGREES
EKG T AXIS: 59 DEGREES
EKG VENTRICULAR RATE: 84 BPM

## 2021-11-03 NOTE — ED NOTES
Pt presents to the front window with complaints of palpitations. Taken immediately to Trauma 1 for assessment. EKG performed. Cardiac monitor applied. Assessment per computer. Assisted into gown. EKG performed. IV inserted. Patient denies pain at this time. States that he was at work and turned \"funny\" and then felt his heart start beating funny.  The funny feeling had subsided prior to arrival.      Kaitlin Nicole RN  11/02/21 [id number]

## 2021-11-03 NOTE — ED PROVIDER NOTES
FLUTICASONE (FLONASE) 50 MCG/ACT NASAL SPRAY    1 spray by Nasal route daily    IBUPROFEN (IBU) 600 MG TABLET    Take 1 tablet by mouth every 6 hours as needed for Pain. ALLERGIES     is allergic to morphine. FAMILY HISTORY     He indicated that his mother is alive. He indicated that his father is alive. He indicated that the status of his maternal grandfather is unknown. He indicated that the status of his paternal grandmother is unknown. He indicated that the status of his paternal grandfather is unknown. He indicated that the status of his maternal aunt is unknown.   family history includes Diabetes in his maternal aunt, maternal grandfather, mother, paternal grandfather, and paternal grandmother; High Blood Pressure in his mother; High Cholesterol in his father and mother; Other in his mother. SOCIAL HISTORY      reports that he quit smoking about 2 years ago. His smoking use included cigarettes. He has a 1.50 pack-year smoking history. He has never used smokeless tobacco. He reports current alcohol use of about 2.0 standard drinks of alcohol per week. He reports that he does not use drugs. PHYSICAL EXAM     INITIAL VITALS:  height is 6' (1.829 m) and weight is 160 lb (72.6 kg). His temperature is 97.8 °F (36.6 °C). His blood pressure is 120/88 and his pulse is 71. His respiration is 14 and oxygen saturation is 100%. Physical Exam  Vitals and nursing note reviewed. Constitutional:       General: He is not in acute distress. Appearance: He is well-developed. HENT:      Head: Atraumatic. Eyes:      Conjunctiva/sclera: Conjunctivae normal.      Pupils: Pupils are equal, round, and reactive to light. Neck:      Thyroid: No thyromegaly. Vascular: No JVD. Trachea: No tracheal deviation. Cardiovascular:      Rate and Rhythm: Normal rate and regular rhythm. Heart sounds: No murmur heard. No friction rub. No gallop.     Pulmonary:      Effort: Pulmonary effort is normal. Breath sounds: Normal breath sounds. Abdominal:      General: Bowel sounds are normal.      Palpations: Abdomen is soft. Tenderness: There is no abdominal tenderness. Musculoskeletal:         General: No tenderness. Cervical back: Neck supple. Neurological:      Mental Status: He is alert. DIAGNOSTIC RESULTS     EKG:   EKG Interpretation    Interpreted by me    Rhythm: normal sinus   Rate: normal  Axis: normal  Ectopy: none  Conduction: normal  ST Segments: no acute change  T Waves: no acute change  Q Waves: none    Clinical Impression: no acute changes and normal EKG      LABS:   Labs Reviewed   CBC WITH AUTO DIFFERENTIAL - Abnormal; Notable for the following components:       Result Value    WBC 4.7 (*)     MCHC 32.8 (*)     All other components within normal limits   GLOMERULAR FILTRATION RATE, ESTIMATED - Abnormal; Notable for the following components:    GFR, Estimated 83 (*)     All other components within normal limits   MAGNESIUM   TROPONIN   BASIC METABOLIC PANEL   ANION GAP   Laboratory studies were unremarkable. EMERGENCY DEPARTMENT COURSE:   Vitals:    Vitals:    11/02/21 2210 11/02/21 2220 11/02/21 2240 11/02/21 2310   BP: 132/84 117/83 137/76 120/88   Pulse: 77 83 75 71   Resp: 11 9 10 14   Temp:       SpO2: 100% 100% 100% 100%   Weight:       Height:         Patient was placed on cardiac monitor, he had no arrhythmia. Reevaluation at 11:15 PM:  He was resting comfortably. He said that he was feeling \"fine\". I discussed with him diagnosis and treatment plan. FINAL IMPRESSION      1. Palpitations          DISPOSITION/PLAN   Discharged home in good condition.     PATIENT REFERRED TO:  Allen Shaver, DO  [address]  [address] [address]  [phone]    In 2 days        DISCHARGE MEDICATIONS:  New Prescriptions    No medications on file       (Please note that portions of this note were completed with a voice recognition program.  Efforts were made to edit the

## 2022-04-08 ENCOUNTER — TELEPHONE (OUTPATIENT)
Dept: CARDIOLOGY CLINIC | Age: 32
End: 2022-04-08

## 2022-04-08 NOTE — TELEPHONE ENCOUNTER
Patient was called to schedule np appointment. MLOM to call back. Being referred from [facility] physicians of [facility] for ongoing palpitations.

## 2022-05-03 ENCOUNTER — NURSE ONLY (OUTPATIENT)
Dept: LAB | Age: 32
End: 2022-05-03

## 2022-05-03 ENCOUNTER — OFFICE VISIT (OUTPATIENT)
Dept: CARDIOLOGY CLINIC | Age: 32
End: 2022-05-03
Payer: COMMERCIAL

## 2022-05-03 VITALS
HEIGHT: 72 IN | SYSTOLIC BLOOD PRESSURE: 127 MMHG | WEIGHT: 155.6 LBS | DIASTOLIC BLOOD PRESSURE: 82 MMHG | BODY MASS INDEX: 21.08 KG/M2 | HEART RATE: 78 BPM

## 2022-05-03 DIAGNOSIS — R00.2 PALPITATION: Primary | ICD-10-CM

## 2022-05-03 DIAGNOSIS — R42 DIZZINESS: ICD-10-CM

## 2022-05-03 DIAGNOSIS — I20.8 OTHER FORMS OF ANGINA PECTORIS (HCC): ICD-10-CM

## 2022-05-03 LAB
ALBUMIN SERPL-MCNC: 4.8 G/DL (ref 3.5–5.1)
ALP BLD-CCNC: 74 U/L (ref 38–126)
ALT SERPL-CCNC: 16 U/L (ref 11–66)
ANION GAP SERPL CALCULATED.3IONS-SCNC: 12 MEQ/L (ref 8–16)
AST SERPL-CCNC: 16 U/L (ref 5–40)
BILIRUB SERPL-MCNC: 0.7 MG/DL (ref 0.3–1.2)
BUN BLDV-MCNC: 13 MG/DL (ref 7–22)
CALCIUM SERPL-MCNC: 9.4 MG/DL (ref 8.5–10.5)
CHLORIDE BLD-SCNC: 99 MEQ/L (ref 98–111)
CHOLESTEROL, TOTAL: 175 MG/DL (ref 100–199)
CO2: 27 MEQ/L (ref 23–33)
CREAT SERPL-MCNC: 0.9 MG/DL (ref 0.4–1.2)
GFR SERPL CREATININE-BSD FRML MDRD: > 90 ML/MIN/1.73M2
GLUCOSE BLD-MCNC: 88 MG/DL (ref 70–108)
HDLC SERPL-MCNC: 60 MG/DL
LDL CHOLESTEROL CALCULATED: 103 MG/DL
POTASSIUM SERPL-SCNC: 3.8 MEQ/L (ref 3.5–5.2)
SODIUM BLD-SCNC: 138 MEQ/L (ref 135–145)
TOTAL PROTEIN: 8.3 G/DL (ref 6.1–8)
TRIGL SERPL-MCNC: 59 MG/DL (ref 0–199)
VITAMIN D 25-HYDROXY: 9 NG/ML (ref 30–100)

## 2022-05-03 PROCEDURE — 99204 OFFICE O/P NEW MOD 45 MIN: CPT | Performed by: INTERNAL MEDICINE

## 2022-05-03 NOTE — PROGRESS NOTES
[address] [address]e [address] [place] [address]  Dept: [phone]  Dept Fax: [phone]  Loc: [phone]    Visit Date: 5/3/2022    Mr. Bill Aquino is a 32 y.o. male  who presented for:  New referral   Chest pain  palpitations   HPI:   VIK Cavazos is a pleasant 32year old male patient who  has a past medical history of Anxiety, Depression, Irritable bowel syndrome, and Pneumothorax. The patient reports chronic palpitations, \"heart racing\" for years, with recent worsening. He also reports intermittent atypical chest pains, occasional dizziness. He has known anxiety. Patient denies SOB, SANTILLAN. He has h/o two ER visits for those complaints in the past, last time ~ 6 months ago. He denies FH of premature CAD. He stopped coffee and energy drinks, still with palpitations. He stopped cigarette smoking. Current Outpatient Medications:     buPROPion (WELLBUTRIN XL) 150 MG extended release tablet, Take 150 mg by mouth daily, Disp: , Rfl:     ibuprofen (IBU) 600 MG tablet, Take 1 tablet by mouth every 6 hours as needed for Pain., Disp: 120 tablet, Rfl: 0    Past Medical History  O'Fallon Wyatt  has a past medical history of Anxiety, Depression, Irritable bowel syndrome, and Pneumothorax. Social History  Pearl Schneider  reports that he quit smoking about 3 years ago. His smoking use included cigarettes. He has a 1.50 pack-year smoking history. He has never used smokeless tobacco. He reports current alcohol use of about 2.0 standard drinks of alcohol per week. He reports that he does not use drugs. Family History  Pearl Schneider family history includes Diabetes in his maternal aunt, maternal grandfather, mother, paternal grandfather, and paternal grandmother; High Blood Pressure in his mother; High Cholesterol in his father and mother; Other in his mother.     Past Surgical History   Past Surgical History:   Procedure Laterality Date    APPENDECTOMY      EYE SURGERY      bilateral    EYE SURGERY      muscle correction     THORACOSCOPY  08/03/2012    Left VATS       Review of Systems   Constitutional: Negative for chills and fever  HENT: Negative for congestion, sinus pressure, sneezing and sore throat. Eyes: Negative for pain, discharge, redness and itching. Respiratory: Negative for apnea, cough  Gastrointestinal: Negative for blood in stool, constipation, diarrhea   Endocrine: Negative for cold intolerance, heat intolerance, polydipsia. Genitourinary: Negative for dysuria, enuresis, flank pain and hematuria. Musculoskeletal: Negative for arthralgias, joint swelling and neck pain. Neurological: Negative for numbness and headaches. Psychiatric/Behavioral: Negative for agitation, confusion, decreased concentration and dysphoric mood. Objective:     /82   Pulse 78   Ht 6' (1.829 m)   Wt 155 lb 9.6 oz (70.6 kg)   BMI 21.10 kg/m²     Wt Readings from Last 3 Encounters:   05/03/22 155 lb 9.6 oz (70.6 kg)   11/02/21 160 lb (72.6 kg)   10/26/21 150 lb (68 kg)     BP Readings from Last 3 Encounters:   05/03/22 127/82   11/02/21 120/88   10/26/21 120/84       Nursing note and vitals reviewed. Physical Exam   Constitutional: Oriented to person, place, and time. Appears well-developed and well-nourished. ENT: Moist mucous membranes. No bleeding. Tongue is midline. Head: Normocephalic and atraumatic. Eyes: EOM are normal. Pupils are equal, round, and reactive to light. Neck: Normal range of motion. Neck supple. No JVD present. Cardiovascular: Normal rate, regular rhythm, II/VI systolic murmur, no rubs, and intact distal pulses. Pulmonary/Chest: Effort normal and breath sounds normal. No respiratory distress. No wheezes. No rales. Abdominal: Soft. Bowel sounds are normal. No distension. There is no tenderness. Musculoskeletal: Normal range of motion. no edema. Neurological: Alert and oriented to person, place, and time.  No cranial nerve deficit. Coordination normal.   Skin: Skin is warm and dry. Psychiatric: Normal mood and affect. No results found for: CKTOTAL, CKMB, CKMBINDEX    Lab Results   Component Value Date    WBC 4.7 11/02/2021    RBC 4.89 11/02/2021    HGB 14.7 11/02/2021    HCT 45.0 11/02/2021    MCV 92.0 11/02/2021    MCH 30.1 11/02/2021    MCHC 32.8 11/02/2021    RDW 12.8 11/02/2021     11/02/2021    MPV 9.0 11/02/2021       Lab Results   Component Value Date     11/02/2021    K 4.0 11/02/2021     11/02/2021    CO2 28 11/02/2021    BUN 13 11/02/2021    LABALBU 4.9 10/26/2021    CREATININE 1.1 11/02/2021    CALCIUM 9.9 10/26/2021    LABGLOM 87 10/26/2021    GLUCOSE 96 11/02/2021       Lab Results   Component Value Date    ALKPHOS 78 10/26/2021    ALT 20 10/26/2021    AST 20 10/26/2021    PROT 7.9 10/26/2021    BILITOT 0.3 10/26/2021    BILIDIR <0.2 10/26/2021    LABALBU 4.9 10/26/2021       Lab Results   Component Value Date    MG 2.0 11/02/2021       No results found for: INR, PROTIME      No results found for: LABA1C    Lab Results   Component Value Date    TRIG 108 06/16/2020    HDL 59 06/16/2020    LDLCALC 75 06/16/2020       Lab Results   Component Value Date    TSH 5.680 10/26/2021         Testing Reviewed:      I have individually reviewed the cardiac test below:    ECHO: No results found for this or any previous visit. Ekg:   EKG Interpretation:  normal sinus rhythm, nonspecific ST/TW changes in inferolateral leads    AssessmentPlan:   Adriana Perez is a pleasant 32year old male patient who  has a past medical history of Anxiety, Depression, Irritable bowel syndrome, and Pneumothorax. The patient reports chronic palpitations, \"heart racing\" for years, with recent worsening. He also reports intermittent atypical chest pains, occasional dizziness. He has known anxiety. Patient denies SOB, SANTILLAN. He has h/o two ER visits for those complaints in the past, last time ~ 6 months ago.  He denies FH of premature CAD. He stopped coffee and energy drinks, still with palpitations. He stopped cigarette smoking. 1. Chest pain  2. Palpitations  3. Abnormal EKG  4. Heart murmur  5. Prior h/o tobacco abuse   6. Dizziness      EKG reviewed   ER visit 11/2021, Troponin <0.01   Based on patient's risk factors and clinical presentation, stress test is indicated to investigate for possible underlying ischemic heart disease. Patient  agrees with that work up and its risks and benefits and potential need for additional testing if stress test is abnormal such as cardiac catheterization   Patient was advised to report to the ER if has recurrent symptoms with specific instructions given about severity and duration of symptoms   Symptoms are concerning for possible underlying rhythm problems, EKG was reviewed. Will proceed with a 48 Holter monitor    Will need to investigate for underlying structural heart disease, will proceed with a transthoracic echocardiogram     Above findings and plan of care were discussed with patient in details, patient's questions were answered.      Disposition:  RTC pending above studies, PRN             Electronically signed by Marycruz Victor MD, Evelyn Retort    5/3/2022 at 11:30 AM EDT

## 2022-05-03 NOTE — PROGRESS NOTES
Pt is here for: np/ MOIRA/ ONGOING PALPITATIONS       Patient denies having any  Sob, MARISOL    PT c/o: chest pain, dizziness, lightheadedness, palpitations.     Last EKG was done on: 11/03/2021

## 2022-05-24 ENCOUNTER — HOSPITAL ENCOUNTER (OUTPATIENT)
Dept: NON INVASIVE DIAGNOSTICS | Age: 32
Discharge: HOME | End: 2022-05-24
Payer: COMMERCIAL

## 2022-05-24 DIAGNOSIS — R42 DIZZINESS: ICD-10-CM

## 2022-05-24 DIAGNOSIS — I20.8 OTHER FORMS OF ANGINA PECTORIS (HCC): ICD-10-CM

## 2022-05-24 DIAGNOSIS — R00.2 PALPITATION: ICD-10-CM

## 2022-05-24 LAB
LV EF: 60 %
LVEF MODALITY: NORMAL

## 2022-05-24 PROCEDURE — 93225 XTRNL ECG REC<48 HRS REC: CPT

## 2022-05-24 PROCEDURE — 93017 CV STRESS TEST TRACING ONLY: CPT | Performed by: INTERNAL MEDICINE

## 2022-05-24 PROCEDURE — 93226 XTRNL ECG REC<48 HR SCAN A/R: CPT

## 2022-05-24 PROCEDURE — 93306 TTE W/DOPPLER COMPLETE: CPT

## 2022-05-24 NOTE — PROCEDURES
The skin was prepped and a 48 hour holter monitor was applied. The patient was instructed on the documentation of symptoms and the purpose of the holter as well as the things to avoid while wearing the holter. The patient was instructed to remove and return the holter on 05/26/2022.   The serial number of the holter that was applied is [id number]

## 2022-05-29 LAB
ACQUISITION DURATION: NORMAL S
AVERAGE HEART RATE: 85 BPM
HOOKUP DATE: NORMAL
HOOKUP TIME: NORMAL
MAX HEART RATE TIME/DATE: NORMAL
MAX HEART RATE: 139 BPM
MIN HEART RATE TIME/DATE: NORMAL
MIN HEART RATE: 51 BPM
NUMBER OF QRS COMPLEXES: NORMAL
NUMBER OF SUPRAVENTRICULAR COUPLETS: 0
NUMBER OF SUPRAVENTRICULAR ECTOPICS: 240
NUMBER OF SUPRAVENTRICULAR ISOLATED BEATS: 74
NUMBER OF VENTRICULAR BIGEMINAL CYCLES: 0
NUMBER OF VENTRICULAR COUPLETS: 0
NUMBER OF VENTRICULAR ECTOPICS: 6